# Patient Record
Sex: MALE | Race: BLACK OR AFRICAN AMERICAN | URBAN - NONMETROPOLITAN AREA
[De-identification: names, ages, dates, MRNs, and addresses within clinical notes are randomized per-mention and may not be internally consistent; named-entity substitution may affect disease eponyms.]

---

## 2021-06-10 ENCOUNTER — IMPORTED ENCOUNTER (OUTPATIENT)
Dept: URBAN - NONMETROPOLITAN AREA CLINIC 1 | Facility: CLINIC | Age: 66
End: 2021-06-10

## 2021-06-10 PROCEDURE — 92015 DETERMINE REFRACTIVE STATE: CPT

## 2021-06-10 PROCEDURE — 92004 COMPRE OPH EXAM NEW PT 1/>: CPT

## 2021-06-10 NOTE — PATIENT DISCUSSION
DEBBIE OU - Borderline glaucoma Suspect - Discussed diangosis in detail with patient- Discussed signs and symptoms - No family history - IOP OS 14 and OS 16- Cup to Disc noted at OD 0.6 and OS 0.55- Pach: 531  OS- OCT: no nerve fiber layer thinning OU.- Gonio: Grade I OU with light pigment done by Marisela Hahn on 3/31/17- S/P PI OU 2017 by Kd Rivera - Continue to monitor  Cataract OU- Discussed diagnosis in detail with patient- Discussed signs and symptoms of progression- Discussed UV protection- No tretament needed at this time - Continue to monitorPresbyopia OU - Discussed diagnosis in detail with patient- New Glasses RX given today- Continue to monitor

## 2021-06-16 PROBLEM — H25.813: Noted: 2021-06-16

## 2021-06-16 PROBLEM — H40.013: Noted: 2021-06-16

## 2021-06-16 PROBLEM — H52.4: Noted: 2021-06-16

## 2021-06-16 PROBLEM — H40.033: Noted: 2021-06-16

## 2022-04-09 ASSESSMENT — PACHYMETRY
OD_CT_UM: 531; ADJ: THIN
OS_CT_UM: 534; ADJ: THIN

## 2022-04-09 ASSESSMENT — TONOMETRY
OD_IOP_MMHG: 14
OS_IOP_MMHG: 16

## 2023-05-16 ENCOUNTER — ESTABLISHED PATIENT (OUTPATIENT)
Dept: URBAN - NONMETROPOLITAN AREA CLINIC 1 | Facility: CLINIC | Age: 68
End: 2023-05-16

## 2023-05-16 DIAGNOSIS — H52.4: ICD-10-CM

## 2023-05-16 DIAGNOSIS — H40.033: ICD-10-CM

## 2023-05-16 DIAGNOSIS — H40.013: ICD-10-CM

## 2023-05-16 PROCEDURE — 92133 CPTRZD OPH DX IMG PST SGM ON: CPT

## 2023-05-16 PROCEDURE — 99214 OFFICE O/P EST MOD 30 MIN: CPT

## 2023-05-16 PROCEDURE — 92015 DETERMINE REFRACTIVE STATE: CPT

## 2023-05-16 ASSESSMENT — TONOMETRY
OD_IOP_MMHG: 16
OS_IOP_MMHG: 20

## 2023-05-16 ASSESSMENT — VISUAL ACUITY
OS_SC: 20/80-1
OU_SC: 20/40-1
OD_PH: 20/25
OD_SC: 20/60-1
OS_PH: 20/25

## 2023-05-16 ASSESSMENT — PACHYMETRY
OS_CT_UM: 558
OD_CT_UM: 623